# Patient Record
Sex: FEMALE | Race: WHITE | Employment: OTHER | URBAN - METROPOLITAN AREA
[De-identification: names, ages, dates, MRNs, and addresses within clinical notes are randomized per-mention and may not be internally consistent; named-entity substitution may affect disease eponyms.]

---

## 2017-04-25 ENCOUNTER — OFFICE VISIT (OUTPATIENT)
Dept: OPHTHALMOLOGY | Facility: CLINIC | Age: 82
End: 2017-04-25
Attending: OPHTHALMOLOGY
Payer: MEDICARE

## 2017-04-25 DIAGNOSIS — E11.311 DIABETIC MACULAR EDEMA (H): ICD-10-CM

## 2017-04-25 DIAGNOSIS — H35.00 BACKGROUND RETINOPATHY: ICD-10-CM

## 2017-04-25 DIAGNOSIS — H35.353 CYSTOID MACULAR DEGENERATION OF RETINA, BILATERAL: ICD-10-CM

## 2017-04-25 PROCEDURE — 92250 FUNDUS PHOTOGRAPHY W/I&R: CPT | Mod: 59 | Performed by: OPHTHALMOLOGY

## 2017-04-25 PROCEDURE — 92134 CPTRZ OPH DX IMG PST SGM RTA: CPT | Mod: ZF | Performed by: OPHTHALMOLOGY

## 2017-04-25 PROCEDURE — 99214 OFFICE O/P EST MOD 30 MIN: CPT | Mod: 25,ZF

## 2017-04-25 ASSESSMENT — VISUAL ACUITY
METHOD: SNELLEN - LINEAR
OS_CC+: -3
OS_CC: 20/30
OD_CC: 20/30
OD_CC+: -2
CORRECTION_TYPE: GLASSES

## 2017-04-25 ASSESSMENT — CUP TO DISC RATIO
OD_RATIO: 0.4
OS_RATIO: 0.4

## 2017-04-25 ASSESSMENT — REFRACTION_WEARINGRX
OS_AXIS: 160
OD_CYLINDER: +2.00
OD_SPHERE: -0.50
OD_ADD: +2.75
OS_AXIS: 160
OD_AXIS: 030
OD_AXIS: 013
OD_CYLINDER: +2.00
OS_ADD: +2.75
OS_SPHERE: -2.00
SPECS_TYPE: PAL
OS_CYLINDER: +2.00
OS_SPHERE: -1.75
OD_SPHERE: -0.50
OS_CYLINDER: +1.75

## 2017-04-25 ASSESSMENT — EXTERNAL EXAM - LEFT EYE: OS_EXAM: NORMAL

## 2017-04-25 ASSESSMENT — CONF VISUAL FIELD
OD_NORMAL: 1
OS_NORMAL: 1

## 2017-04-25 ASSESSMENT — EXTERNAL EXAM - RIGHT EYE: OD_EXAM: NORMAL

## 2017-04-25 ASSESSMENT — SLIT LAMP EXAM - LIDS
COMMENTS: NORMAL
COMMENTS: NORMAL

## 2017-04-25 ASSESSMENT — TONOMETRY
IOP_METHOD: ICARE
OS_IOP_MMHG: 11
OD_IOP_MMHG: 10

## 2017-04-25 NOTE — MR AVS SNAPSHOT
After Visit Summary   4/25/2017    Mila Rodriguez    MRN: [id number]           Patient Information     Date Of Birth          5/5/1927        Visit Information        Provider Department      4/25/2017 11:00 AM Em[facility]atriz Franco [facility]        Today's Diagnoses     Diabetic macular edema (H)        Cystoid macular degeneration of retina, bilateral        Background retinopathy           Follow-ups after your visit        Follow-up notes from your care team     Return in 4 months (on 8/25/2017) for OCT OU.      Future tests that were ordered for you today     Open Future Orders        Priority Expected Expires Ordered    OCT Retina Spectralis OU (both eyes) Routine  10/27/2018 4/25/2017    OCT Retina Spectralis OU (both eyes) Routine  10/27/2018 4/25/2017    OCT Retina Spectralis OU (both eyes) Routine  10/27/2018 4/25/2017            Who to contact     Please call your clinic at [phone] to:    Ask questions about your health    Make or cancel appointments    Discuss your medicines    Learn about your test results    Speak to your doctor   If you have compliments or concerns about an experience at your clinic, or if you wish to file a complaint, please contact [facility] Patient Relations at [phone] or email us at [email or web address]         Additional Information About Your Visit        MyChart Information     [employer] gives you secure access to your electronic health record. If you see a primary care provider, you can also send messages to your care team and make appointments. If you have questions, please call your primary care clinic.  If you do not have a primary care provider, please call [phone] and they will assist you.      [employer] is an electronic gateway that provides easy, online access to your medical records. With [employer], you can request a clinic appointment, read your test results, renew a prescription or communicate  with your care team.     To access your existing account, please contact your [facility] or call [phone] for assistance.        Care EveryWhere ID     This is your Care EveryWhere ID. This could be used by other organizations to access your [place] medical records  [id number]         Blood Pressure from Last 3 Encounters:   No data found for BP    Weight from Last 3 Encounters:   No data found for Wt              We Performed the Following     Fundus Photos OU (both eyes)     OCT Retina Spectralis OU (both eyes)        Primary Care Provider Office Phone # Fax #    Bandar Rhodes [phone]       [facility] [address]  [place] [address]        Thank you!     Thank you for choosing EYE CLINIC  for your care. Our goal is always to provide you with excellent care. Hearing back from our patients is one way we can continue to improve our services. Please take a few minutes to complete the written survey that you may receive in the mail after your visit with us. Thank you!             Your Updated Medication List - Protect others around you: Learn how to safely use, store and throw away your medicines at www.disposemymeds.org.          This list is accurate as of: 4/25/17  1:02 PM.  Always use your most recent med list.                   Brand Name Dispense Instructions for use    aspirin 325 MG tablet      Take 1 tablet by mouth daily       VITAMIN D3 PO      Take by mouth daily

## 2017-04-25 NOTE — PROGRESS NOTES
CC -   Retinopathy    INTERVAL HISTORY: Having trouble with vision due to glasses being made incorrect per patient. Otherwise stable. Leaving to [place] soon.     HPI -     Mila Rodriguez is a  89 year old year-old patient presenting for retinopathy.    Was told doesn't have diabetes by PMD per patient has checked A1c multiple times. No anemia.  Good BP control. No h/o carotid stenosis known.      PAST OCULAR HISTORY  CE/IOL OU  2011 & 2012    Retinal Imaging:  OCT  4-25-17  right eye- focal areas of atrophy, mild extrafoveal cysts, stable  left eye -  focal areas of atrophy, mild central cyst is smaller    PHOTOS 4/25/2017   Right eye - focal areas of RPE atrophy  Left eye - focal areas of RPE atrophy    FA 12-13-16  Right eye - (transit) increased arm-eye time, normal AV transit, 1+ increased MARQUISE, 1+ macular MAs and WD from atrophy, 3-4+ peripheral ischemia/non-perfusion, no NV, ONH normal,  ischemic leakage from vessels  Left eye - 1+ increased MARQUISE, 1+ macular MAs and WD from atrophy, 3-4+ peripheral ischemia/non-perfusion, no NV, ONH normal, ischemic leakage from vessels      ASSESSMENT & PLAN    1.  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  CME OU vs DME   - s/p Lucentis x 1 2/2012 OS   - looks typical for DME   - trace OU today   - observe    3.  PVD OU   - advised S/Sx RD         return to clinic: 3-4 months, OCT OU    Tez Fuentes MD MPH   Ophthalmology Resident PGY-3        ATTESTATION     Attending Physician Attestation:      Complete documentation of historical and exam elements from today's encounter can be found in the full encounter summary report (not redupilcated in this progress note).  I personally obtained the chief complaint(s) and hisotry of present illness., I have confirmed and edited as necessary the Past medical history/Past Surgical History, Social history, Family medical history,  Review of systems, and exam/neuro findings as obtained by the technician or others. I have examined this patient myself. , I personally viewed the relevant tests, image(s), reports, and studies listed above and the documentation reflects my findings and interpretation. and I formulated and edited as necessary the assessment and plan and discussed the findings and management plan with the patient and family.    Emperatriz Franco MD, PhD  , Vitreoretinal Surgery  Department of Ophthalmology  [facility]

## 2017-04-25 NOTE — NURSING NOTE
Chief Complaints and History of Present Illnesses   Patient presents with     Follow Up For     Vascular retinopathy OU     HPI    Affected eye(s):  Both   Symptoms:     No decreased vision   No floaters   No flashes         Do you have eye pain now?:  No      Comments:  Follow up Vascular retinopathy OU.  The patient is having difficulty with her glasses and she is unsure which ones to wear.    VIKAS Lan 11:09 AM 04/25/2017

## 2017-09-26 ENCOUNTER — OFFICE VISIT (OUTPATIENT)
Dept: OPHTHALMOLOGY | Facility: CLINIC | Age: 82
End: 2017-09-26
Attending: OPHTHALMOLOGY
Payer: MEDICARE

## 2017-09-26 DIAGNOSIS — H35.353 CYSTOID MACULAR DEGENERATION OF RETINA, BILATERAL: ICD-10-CM

## 2017-09-26 PROCEDURE — 99213 OFFICE O/P EST LOW 20 MIN: CPT | Mod: ZF

## 2017-09-26 PROCEDURE — 92134 CPTRZ OPH DX IMG PST SGM RTA: CPT | Mod: ZF | Performed by: OPHTHALMOLOGY

## 2017-09-26 ASSESSMENT — SLIT LAMP EXAM - LIDS
COMMENTS: NORMAL
COMMENTS: NORMAL

## 2017-09-26 ASSESSMENT — REFRACTION_WEARINGRX
OS_CYLINDER: +1.75
OD_SPHERE: -0.50
OD_CYLINDER: +2.00
OS_SPHERE: -1.75
OS_AXIS: 160
OS_ADD: +2.75
OS_SPHERE: -2.00
SPECS_TYPE: PAL
OD_AXIS: 013
OS_AXIS: 160
OD_AXIS: 030
OD_ADD: +2.75
OD_SPHERE: -0.50
OS_CYLINDER: +2.00
OD_CYLINDER: +2.00

## 2017-09-26 ASSESSMENT — TONOMETRY
OD_IOP_MMHG: 16
OS_IOP_MMHG: 17
IOP_METHOD: TONOPEN

## 2017-09-26 ASSESSMENT — EXTERNAL EXAM - RIGHT EYE: OD_EXAM: NORMAL

## 2017-09-26 ASSESSMENT — VISUAL ACUITY
METHOD: SNELLEN - LINEAR
OS_CC+: -2
OS_CC: 20/30
OS_CC: J1+
OD_CC: 20/30
OD_CC: J1+
OD_CC+: -2

## 2017-09-26 ASSESSMENT — CONF VISUAL FIELD
OS_NORMAL: 1
OD_NORMAL: 1
METHOD: COUNTING FINGERS

## 2017-09-26 ASSESSMENT — EXTERNAL EXAM - LEFT EYE: OS_EXAM: NORMAL

## 2017-09-26 ASSESSMENT — CUP TO DISC RATIO
OS_RATIO: 0.4
OD_RATIO: 0.4

## 2017-09-26 NOTE — MR AVS SNAPSHOT
After Visit Summary   9/26/2017    Mila Rodriguez    MRN: [id number]           Patient Information     Date Of Birth          5/5/1927        Visit Information        Provider Department      9/26/2017 11:45 AM Em[facility]atriz [facility] MD [facility]        Today's Diagnoses     Cystoid macular degeneration of retina, bilateral           Follow-ups after your visit        Follow-up notes from your care team     Return for FA OU (transit OD, Optos), OCT OU.      Future tests that were ordered for you today     Open Future Orders        Priority Expected Expires Ordered    OCT Retina Spectralis OU (both eyes) Routine  3/30/2019 9/26/2017    OCT Retina Spectralis OU (both eyes) Routine  3/30/2019 9/26/2017    Fluorescein Angiography OU (both eyes) Routine  3/30/2019 9/26/2017            Who to contact     Please call your clinic at [phone] to:    Ask questions about your health    Make or cancel appointments    Discuss your medicines    Learn about your test results    Speak to your doctor   If you have compliments or concerns about an experience at your clinic, or if you wish to file a complaint, please contact [facility] Patient Relations at [phone] or email us at [email or web address]         Additional Information About Your Visit        MyChart Information     [employer] gives you secure access to your electronic health record. If you see a primary care provider, you can also send messages to your care team and make appointments. If you have questions, please call your primary care clinic.  If you do not have a primary care provider, please call [phone] and they will assist you.      [employer] is an electronic gateway that provides easy, online access to your medical records. With [employer], you can request a clinic appointment, read your test results, renew a prescription or communicate with your care team.     To access your existing account, please  contact your [facility] or call [phone] for assistance.        Care EveryWhere ID     This is your Care EveryWhere ID. This could be used by other organizations to access your [place] medical records  [id number]         Blood Pressure from Last 3 Encounters:   No data found for BP    Weight from Last 3 Encounters:   No data found for Wt              We Performed the Following     OCT Retina Spectralis OU (both eyes)        Primary Care Provider Office Phone # Fax #    Bandar Rhodes [phone]       [facility] [address]  [facility] [address]        Equal Access to Services     [facility]: Hadii aad ku hadasho Soomaali, waaxda luqadaha, qaybta kaalmada adeegyada, waxay idiin hayaan adeeg khdorothy cat . So [facility] [phone].    ATENCIÓN: Si habla español, tiene a connolly disposición servicios gratuitos de asistencia lingüística. [facility] [phone].    We comply with applicable federal civil rights laws and [place] laws. We do not discriminate on the basis of race, color, national origin, age, disability sex, sexual orientation or gender identity.            Thank you!     Thank you for choosing [facility]  for your care. Our goal is always to provide you with excellent care. Hearing back from our patients is one way we can continue to improve our services. Please take a few minutes to complete the written survey that you may receive in the mail after your visit with us. Thank you!             Your Updated Medication List - Protect others around you: Learn how to safely use, store and throw away your medicines at www.disposemymeds.org.          This list is accurate as of: 9/26/17  3:02 PM.  Always use your most recent med list.                   Brand Name Dispense Instructions for use Diagnosis    aspirin 325 MG tablet      Take 1 tablet by mouth daily        VITAMIN D3 PO      Take by mouth daily

## 2017-09-26 NOTE — PROGRESS NOTES
CC -   Follow up Retinopathy    INTERVAL HISTORY: Having trouble with vision feels current glasses not good.   Otherwise stable.       HPI -     Mila Rodriguez is a  89 year old year-old patient presenting for retinopathy.    Was told doesn't have diabetes by PMD per patient has checked A1c multiple times. No anemia.  Good BP control. No h/o carotid stenosis known.  Patient denies HTn      PAST OCULAR HISTORY  CE/IOL OU  2011 & 2012    RETINAL IMAGING  OCT  9-26-17  right eye- focal areas of atrophy, mild extrafoveal cysts, stable  left eye -  focal areas of atrophy, mild central cyst is smaller    PHOTOS 4/25/2017   Right eye - focal areas of RPE atrophy  Left eye - focal areas of RPE atrophy    FA 12-13-16  Right eye - (transit) increased arm-eye time, normal AV transit, 1+ increased MARQUISE, 1+ macular MAs and WD from atrophy, 3-4+ peripheral ischemia/non-perfusion, no NV, ONH normal,  ischemic leakage from vessels  Left eye - 1+ increased MARQUISE, 1+ macular MAs and WD from atrophy, 3-4+ peripheral ischemia/non-perfusion, no NV, ONH normal, ischemic leakage from vessels      ASSESSMENT & PLAN    1.  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  CME OU     - s/p Lucentis x 1 2/2012 OS   - looks typical for diabetic macular edema but workup negative   - trace OU today   - observe    3.  PVD OU   - advised S/Sx Retinal Detachment         return to clinic: 3-4 months,  OCT both eyes, FA transit Right  Eye     Ángel Samayoa MD, PhD  Vitreoretinal Surgery Fellow      ATTESTATION     Attending Physician Attestation:      Complete documentation of historical and exam elements from today's encounter can be found in the full encounter summary report (not reduplicated in this progress note).  I personally obtained the chief complaint(s) and history of present illness.  I confirmed and edited as necessary the review of systems, past medical/surgical history, family history, social history, and examination findings as documented by others; and I examined the patient myself.  I personally reviewed the relevant tests, images, and reports as documented above.  I personally reviewed the ophthalmic test(s) associated with this encounter, agree with the interpretation(s) as documented by the resident/fellow, and have edited the corresponding report(s) as necessary.   I formulated and edited as necessary the assessment and plan and discussed the findings and management plan with the patient and family        Emperatriz Franco MD, PhD  , Vitreoretinal Surgery  Department of Ophthalmology  [facility]

## 2017-09-26 NOTE — NURSING NOTE
Chief Complaints and History of Present Illnesses   Patient presents with     Follow Up For     Diabetic macular edema (H) changes in [place]     HPI    Symptoms:              Comments:  She is here today for a follow up of Diabetic macular edema (H)   She feels her glasses are not working for her and brought two pair she feels are the same.  After reading both , I explained they are different. One for distance and the other was for reading or the computer.    Abilio Saenz COT 12:27 PM September 26, 2017

## 2019-01-14 ENCOUNTER — OFFICE VISIT (OUTPATIENT)
Dept: OPHTHALMOLOGY | Facility: CLINIC | Age: 84
End: 2019-01-14
Attending: OPHTHALMOLOGY
Payer: MEDICARE

## 2019-01-14 DIAGNOSIS — H35.353 CYSTOID MACULAR DEGENERATION OF RETINA, BILATERAL: ICD-10-CM

## 2019-01-14 DIAGNOSIS — H35.63 RETINAL HEMORRHAGE OF BOTH EYES: Primary | ICD-10-CM

## 2019-01-14 DIAGNOSIS — H35.00 RETINOPATHY: ICD-10-CM

## 2019-01-14 PROCEDURE — 92015 DETERMINE REFRACTIVE STATE: CPT | Mod: GY,ZF

## 2019-01-14 PROCEDURE — 92134 CPTRZ OPH DX IMG PST SGM RTA: CPT | Mod: ZF | Performed by: OPHTHALMOLOGY

## 2019-01-14 PROCEDURE — G0463 HOSPITAL OUTPT CLINIC VISIT: HCPCS | Mod: ZF,25

## 2019-01-14 PROCEDURE — 92235 FLUORESCEIN ANGRPH MLTIFRAME: CPT | Mod: ZF | Performed by: OPHTHALMOLOGY

## 2019-01-14 RX ORDER — SIMVASTATIN 40 MG
40 TABLET ORAL DAILY
Refills: 3 | COMMUNITY
Start: 2018-10-30

## 2019-01-14 RX ORDER — CLOPIDOGREL BISULFATE 75 MG/1
75 TABLET ORAL DAILY
Refills: 3 | COMMUNITY
Start: 2019-01-08

## 2019-01-14 RX ORDER — LISINOPRIL 2.5 MG/1
2.5 TABLET ORAL DAILY
Refills: 1 | COMMUNITY
Start: 2018-04-24

## 2019-01-14 RX ORDER — LISINOPRIL 5 MG/1
5 TABLET ORAL DAILY
Refills: 3 | COMMUNITY
Start: 2019-01-08 | End: 2019-01-14 | Stop reason: DRUGHIGH

## 2019-01-14 RX ORDER — ACETAMINOPHEN 160 MG
2000 TABLET,DISINTEGRATING ORAL DAILY
Refills: 11 | COMMUNITY
Start: 2018-10-16

## 2019-01-14 RX ORDER — TORSEMIDE 20 MG/1
20 TABLET ORAL DAILY
COMMUNITY
End: 2019-07-08

## 2019-01-14 RX ORDER — NYSTATIN 100000 U/G
100000 CREAM TOPICAL PRN
Refills: 0 | COMMUNITY
Start: 2018-04-24 | End: 2019-07-08

## 2019-01-14 RX ORDER — METOPROLOL SUCCINATE 50 MG/1
50 TABLET, EXTENDED RELEASE ORAL DAILY
Refills: 3 | COMMUNITY
Start: 2019-01-08

## 2019-01-14 ASSESSMENT — CUP TO DISC RATIO
OD_RATIO: 0.4
OS_RATIO: 0.4

## 2019-01-14 ASSESSMENT — VISUAL ACUITY
METHOD: SNELLEN - LINEAR
OD_CC: J3
OS_CC: 20/40-1
OD_CC+: -2
OS_CC+: +1
CORRECTION_TYPE: GLASSES
OS_CC: J3
OD_CC: 20/30

## 2019-01-14 ASSESSMENT — EXTERNAL EXAM - LEFT EYE: OS_EXAM: NORMAL

## 2019-01-14 ASSESSMENT — SLIT LAMP EXAM - LIDS
COMMENTS: NORMAL
COMMENTS: NORMAL

## 2019-01-14 ASSESSMENT — EXTERNAL EXAM - RIGHT EYE: OD_EXAM: NORMAL

## 2019-01-14 ASSESSMENT — REFRACTION_WEARINGRX
OS_SPHERE: -2.00
OD_SPHERE: -0.50
SPECS_TYPE: PAL
OS_ADD: +2.75
OS_CYLINDER: +1.75
OD_AXIS: 030
OD_ADD: +2.75
OD_CYLINDER: +2.00
OS_AXIS: 160

## 2019-01-14 ASSESSMENT — REFRACTION_MANIFEST
OD_SPHERE: -1.00
OD_ADD: +2.75
OD_CYLINDER: +2.00
OS_CYLINDER: +2.00
OD_AXIS: 032
OS_ADD: +2.75
OS_SPHERE: -1.25
OS_AXIS: 153

## 2019-01-14 ASSESSMENT — CONF VISUAL FIELD
METHOD: COUNTING FINGERS
OS_NORMAL: 1
OD_NORMAL: 1

## 2019-01-14 ASSESSMENT — TONOMETRY
IOP_METHOD: ICARE
OS_IOP_MMHG: 16
OD_IOP_MMHG: 15

## 2019-01-14 NOTE — NURSING NOTE
Chief Complaints and History of Present Illnesses   Patient presents with     Annual Eye Exam     Chief Complaint(s) and History of Present Illness(es)     Annual Eye Exam     Laterality: both eyes    Quality: blurred vision    Severity: mild    Timing: throughout the day and at random times    Course: stable    Associated symptoms: Negative for dryness, flashes and floaters    Treatments tried: eye drops    Response to treatment: mild improvement    Pain scale: 0/10              Comments     Biannual bilateral exam for Vascular Retinopathy     Vision is blurrier in the distance and near, Pt would like an updated gls Rx.     Ocular meds: ATs JAIMIE Siddiqi COT 9:23 AM January 14, 2019

## 2019-01-14 NOTE — PROGRESS NOTES
"CC -   Follow up Retinopathy    INTERVAL HISTORY:  Last visit > 1 year ago. Had transfemoral aortic valve replacement recently. Notices some declining vision OU over past few months. She also states that during her heart valve replacement she was told \"they see cancer\" but she does not know where they are talking about; she follows with oncology in future but not sure why.    MONTSE Rodriguez is a 89 year old year-old patient presenting for idiopathic vascular retinopathy.    Was told doesn't have diabetes by PMD per patient has checked A1c multiple times. No anemia.  Good BP control. No h/o carotid stenosis known.  Patient denies HTN    HbA1c 5.5 8/2016    PAST OCULAR HISTORY  CE/IOL OU  2011 & 2012    RETINAL IMAGING  OCT  1-14-19  OD- focal areas of atrophy, mild extrafoveal cysts, stable  OS -  focal areas of atrophy, mild extrafoveal nasal cysts    PHOTOS 4/25/2017   Right eye - focal areas of RPE atrophy  Left eye - focal areas of RPE atrophy    FA 1-14-19  Right eye - (transit) increased arm-eye time, normal AV transit, 1+ increased MARQUISE, 1+ macular MAs and WD from atrophy, 3-4+ peripheral ischemia/non-perfusion, no NV, ONH normal,  ischemic leakage from vessels  Left eye - 1+ increased MARQUISE, 1+ macular MAs and WD from atrophy, 3-4+ peripheral ischemia/non-perfusion, no NV, ONH normal, ischemic leakage from vessels      ASSESSMENT & PLAN    1.  Idiopathic Vascular retinopathy OU    - extensive peripheral ischemic changes no NV   - looks typical for severe NPDR   - patient states now told doesn't have DM and never did, A1c testing normal   - denies HTN   - carotid U/S 2016 < 50% bilateral   - patient has peripheral vascular disease   - no inflammation seen on exam (suspect vessel leakage is ischemic)      - FA 1/2019 similar to 12/2016   - Observe   - could consider SPEP and/or MRA if not done in past   - patient will contact PMD to discuss ?CA discovered during cardiac surgery    2.  H/o CME OU     - " s/p Lucentis x 1 2/2012 OS   - looks typical for diabetic macular edema but workup negative   - trace OU today   - observe    3.  PVD OU   - advised S/Sx Retinal Detachment      return to clinic: 3-4 months,  OCT both eyes    Tremayne Davis M.D.  PGY-3, Ophthalmology      ATTESTATION     Attending Physician Attestation:      Complete documentation of historical and exam elements from today's encounter can be found in the full encounter summary report (not reduplicated in this progress note).  I personally obtained the chief complaint(s) and history of present illness.  I confirmed and edited as necessary the review of systems, past medical/surgical history, family history, social history, and examination findings as documented by others; and I examined the patient myself.  I personally reviewed the relevant tests, images, and reports as documented above.  I personally reviewed the ophthalmic test(s) associated with this encounter, agree with the interpretation(s) as documented by the resident/fellow, and have edited the corresponding report(s) as necessary.   I formulated and edited as necessary the assessment and plan and discussed the findings and management plan with the patient and family    Emperatriz Franco MD, PhD  , Vitreoretinal Surgery  Department of Ophthalmology  [facility]

## 2019-06-27 ENCOUNTER — TELEPHONE (OUTPATIENT)
Dept: OPHTHALMOLOGY | Facility: CLINIC | Age: 84
End: 2019-06-27

## 2019-06-27 NOTE — TELEPHONE ENCOUNTER
[facility] Call Center    Phone Message    May a detailed message be left on voicemail: yes    Reason for Call: Other: Pt requests a phone call from either Asif  or Mayelin to assist with scheduling an appt om 7/8 which is urgent.     Action Taken: Message routed to:  [facility] ([facility] eye clinic

## 2019-07-08 ENCOUNTER — OFFICE VISIT (OUTPATIENT)
Dept: OPHTHALMOLOGY | Facility: CLINIC | Age: 84
End: 2019-07-08
Attending: OPHTHALMOLOGY
Payer: MEDICARE

## 2019-07-08 DIAGNOSIS — H35.353 CYSTOID MACULAR DEGENERATION OF RETINA, BILATERAL: ICD-10-CM

## 2019-07-08 PROCEDURE — G0463 HOSPITAL OUTPT CLINIC VISIT: HCPCS | Mod: ZF

## 2019-07-08 PROCEDURE — 92134 CPTRZ OPH DX IMG PST SGM RTA: CPT | Mod: ZF | Performed by: OPHTHALMOLOGY

## 2019-07-08 RX ORDER — ASPIRIN 81 MG/1
81 TABLET ORAL
COMMUNITY
Start: 2018-04-30

## 2019-07-08 ASSESSMENT — VISUAL ACUITY
OD_CC: 20/25
OS_CC+: -1+2
CORRECTION_TYPE: GLASSES
OS_CC: 20/40
METHOD: SNELLEN - LINEAR
OS_PH_CC: 20/30
OD_CC+: -2

## 2019-07-08 ASSESSMENT — REFRACTION_WEARINGRX
OD_SPHERE: -1.00
OS_AXIS: 153
OS_ADD: +2.75
OS_CYLINDER: +2.00
OD_ADD: +2.75
OS_SPHERE: -1.25
OD_CYLINDER: +2.00
OD_AXIS: 032

## 2019-07-08 ASSESSMENT — TONOMETRY
OD_IOP_MMHG: 17
IOP_METHOD: TONOPEN
OS_IOP_MMHG: 15

## 2019-07-08 ASSESSMENT — SLIT LAMP EXAM - LIDS
COMMENTS: NORMAL
COMMENTS: NORMAL

## 2019-07-08 ASSESSMENT — EXTERNAL EXAM - RIGHT EYE: OD_EXAM: NORMAL

## 2019-07-08 ASSESSMENT — CUP TO DISC RATIO
OD_RATIO: 0.4
OS_RATIO: 0.4

## 2019-07-08 ASSESSMENT — EXTERNAL EXAM - LEFT EYE: OS_EXAM: NORMAL

## 2019-07-08 ASSESSMENT — CONF VISUAL FIELD: OS_SUPERIOR_NASAL_RESTRICTION: 3

## 2019-07-08 NOTE — PROGRESS NOTES
CC -   Follow up Retinopathy    INTERVAL HISTORY:  More difficulty reading and seeing since JEROME.      HPI -     Mila Rodriguez is a 89 year old year-old patient presenting for idiopathic vascular retinopathy.    Was told doesn't have diabetes by PMD per patient has checked A1c multiple times. No anemia.  Good BP control. No h/o carotid stenosis known.  Patient denies HTN.  H/o aortic valve replacement, found ?mass ?etiology, but has declined further w/u    HbA1c 5.5 8/2016    PAST OCULAR HISTORY  CE/IOL OU  2011 & 2012    RETINAL IMAGING  OCT  7-8-19  OD- focal outer atrophy, mild extrafoveal cysts, stable  OS -  focal outer atrophy, mild extrafoveal nasal cysts      FA 1-14-19  Right eye - (transit) increased arm-eye time, normal AV transit, 1+ increased MARQUISE, 1+ macular MAs and WD from atrophy, 3-4+ peripheral ischemia/non-perfusion, no NV, ONH normal,  ischemic leakage from vessels  Left eye - 1+ increased MARQUISE, 1+ macular MAs and WD from atrophy, 3-4+ peripheral ischemia/non-perfusion, no NV, ONH normal, ischemic leakage from vessels      ASSESSMENT & PLAN    1.  Idiopathic Vascular retinopathy OU    - extensive peripheral ischemic changes no NV   - looks typical for severe NPDR   - patient states now told doesn't have DM and never did, A1c testing normal   - denies HTN   - carotid U/S 2016 < 50% bilateral   - patient has peripheral vascular disease   - no inflammation seen on exam (suspect vessel leakage is ischemic)      - FA 1/2019 similar to 12/2016   - Observe   - could consider SPEP and/or MRA if not done in past      2.  H/o CME OU     - s/p Lucentis x 1 2/2012 OS   - looks typical for diabetic macular edema but workup negative   - trace OU today   - observe    3.  PVD OU   - advised S/Sx Retinal Detachment 7/2019      return to clinic: 6 months,  OCT both eyes        ATTESTATION     Attending Physician Attestation:      Complete documentation of historical and exam elements from today's encounter can be  found in the full encounter summary report (not reduplicated in this progress note).  I personally obtained the chief complaint(s) and history of present illness.  I confirmed and edited as necessary the review of systems, past medical/surgical history, family history, social history, and examination findings as documented by others; and I examined the patient myself.  I personally reviewed the relevant tests, images, and reports as documented above.  I formulated and edited as necessary the assessment and plan and discussed the findings and management plan with the patient and family    Emperatriz Franco MD, PhD  , Vitreoretinal Surgery  Department of Ophthalmology  [facility]

## 2019-07-08 NOTE — NURSING NOTE
"Chief Complaints and History of Present Illnesses   Patient presents with     Follow Up     Chief Complaint(s) and History of Present Illness(es)     Follow Up     Laterality: both eyes    Associated symptoms: Negative for dryness, eye pain, redness and tearing    Pain scale: 0/10              Comments     Patient returns to clinic for a follow up of Idiopathic Vascular retinopathy of both eyes.  She got new glasses since her last exam and has trouble reading newspaper print ([employer]).  Using bright light is helping.  She feels like she is having to \"focus so much\".        Maryjo Reynolds COT 8:29 AM July 8, 2019                     "

## 2019-07-11 ENCOUNTER — TELEPHONE (OUTPATIENT)
Dept: OPHTHALMOLOGY | Facility: CLINIC | Age: 84
End: 2019-07-11

## 2019-07-11 NOTE — TELEPHONE ENCOUNTER
Reviewed able to schedule separate appt for Dr. Cummings a few days earlier (not on august 30th-- Dr. Cummings not in).  Pt would need to come back august 1st for Annel RICARDO appt    Pt ok keeping appt's as is august 1st    Reviewed with pt to check with insurance on coverage of refraction    Pt states received bill for refraction after January visit-- has not have refraction before that refraction since 2017 per pt  Asked pt to call insurance to review refraction policies      Asif Keita RN 4:23 PM 07/11/19        [facility] Call Center    Phone Message    May a detailed message be left on voicemail: no    Reason for Call: Symptoms or Concerns     If patient has red-flag symptoms, warm transfer to triage line    Current symptom or concern: Patient called stating she would like to be seen 7/30. There is no availability for that date. Stated that she would like to be seen earlier. She has an appointment scheduled for 8/1. Please advise.       Action Taken: Message routed to:  [facility] Ophthalmology

## 2019-08-01 ENCOUNTER — OFFICE VISIT (OUTPATIENT)
Dept: OPTOMETRY | Facility: CLINIC | Age: 84
End: 2019-08-01
Payer: MEDICARE

## 2019-08-01 ENCOUNTER — HOSPITAL ENCOUNTER (OUTPATIENT)
Dept: OCCUPATIONAL THERAPY | Facility: CLINIC | Age: 84
Discharge: HOME | End: 2019-08-01
Attending: OCCUPATIONAL THERAPIST | Admitting: OCCUPATIONAL THERAPIST
Payer: MEDICARE

## 2019-08-01 DIAGNOSIS — H35.00: Primary | ICD-10-CM

## 2019-08-01 DIAGNOSIS — H52.4 PRESBYOPIA: ICD-10-CM

## 2019-08-01 PROCEDURE — 97165 OT EVAL LOW COMPLEX 30 MIN: CPT | Mod: GO | Performed by: OCCUPATIONAL THERAPIST

## 2019-08-01 ASSESSMENT — REFRACTION_MANIFEST
OD_AXIS: 015
OD_CYLINDER: +2.00
OS_CYLINDER: +2.50
OS_SPHERE: -1.25
OS_AXIS: 160
OD_SPHERE: +0.75

## 2019-08-01 ASSESSMENT — VISUAL ACUITY
OD: 20/25
OS: 20/40

## 2019-08-01 ASSESSMENT — ACTIVITIES OF DAILY LIVING (ADL): PRIOR_ADL/IADL_STATUS: IMPAIRED PRIOR TO ONSET

## 2019-08-02 ASSESSMENT — VISUAL ACUITY
OS_CC: 20/40
OD_CC: 20/30
METHOD: SNELLEN - LINEAR

## 2019-08-02 ASSESSMENT — EXTERNAL EXAM - RIGHT EYE: OD_EXAM: NORMAL

## 2019-08-02 ASSESSMENT — CONF VISUAL FIELD
OS_NORMAL: 1
OD_NORMAL: 1

## 2019-08-02 ASSESSMENT — CUP TO DISC RATIO
OD_RATIO: 0.4
OS_RATIO: 0.4

## 2019-08-02 ASSESSMENT — TONOMETRY
IOP_METHOD: ICARE
OD_IOP_MMHG: 16
OS_IOP_MMHG: 16

## 2019-08-02 ASSESSMENT — SLIT LAMP EXAM - LIDS
COMMENTS: NORMAL
COMMENTS: NORMAL

## 2019-08-02 ASSESSMENT — EXTERNAL EXAM - LEFT EYE: OS_EXAM: NORMAL

## 2019-08-02 NOTE — PROGRESS NOTES
Assessment/Plan  (H35.00) Vascular retinopathy of both eyes  (primary encounter diagnosis)  Comment: Patient follows with Dr. Franco in retina. Prior testing for diabetes/HTN negative.  Plan: Continue following with retina clinic. RTC with any new blurring of vision or flashes/floaters/curtain over vision. Continue care with Low Vision OT regarding reading goals. Emphasized importance of adequate lighting when reading fine print.    (H52.4) Presbyopia  Plan: REFRACTION [35044]        SRx updated and released. Additional magnification with slightly reduced working distance was recommended and accepted by patient. Plan on monitoring annually, sooner with changes to vision or prolonged difficulty adapting to new Rx.       Complete documentation of historical and exam elements from today's encounter can  be found in the full encounter summary report (not reduplicated in this progress  note). I personally obtained the chief complaint(s) and history of present illness. I  confirmed and edited as necessary the review of systems, past medical/surgical  history, family history, social history, and examination findings as documented by  others; and I examined the patient myself. I personally reviewed the relevant tests,  images, and reports as documented above. I formulated and edited as necessary the  assessment and plan and discussed the findings and management plan with the  patient and family.    Freddie Cummings OD

## 2019-08-03 NOTE — PROGRESS NOTES
"   08/01/19 0900   Visit Type   Type of Visit Initial       Present No   General Information   Start Of Care Date 08/01/19   Referring Physician Freddie Cummings MD   Orders Evaluate And Treat As Indicated   Date of Order 08/01/19   Medical Diagnosis idiopathic vascular retinopathy   Onset Of Illness/injury Or Date Of Surgery 08/01/2019   Surgical/Medical history reviewed   (heart valve replacement, neuropathy in feet, 2 MI's )   Precautions/Limitations poor balance, lower extremity neuropathy - reports measurable change in balance this week. Recommended pt see primary care to evaluate   Prior ADL/IADL status Impaired prior to onset   Others present at visit   (dtr Jessie)   Patient/family Goals Statement reading, lighting, computer communications (can't see keyboard), needs light for filing   Social History/Home Environment   Living Environment   (senior apartment)   Current Community Support   (cleaning help occassional - PRN)   Avocational Reading, keeping abreast of politics:, [employer], Economist   Pain Assessment   Pain Reported No   Comments has developed pain in last week, gout arthritis. No pain while in rehabilitation clinic    Fall Risk Screen   Fall screen completed by OT   Have you fallen 2 or more times in the past year? No   Have you fallen and had an injury in the past year? No   Is patient a fall risk? Yes   Fall screen comments pt walked to clinic with single end cane with poor balance. \"caught\" self on wall X2 to prevent fall. Reports she walks \"fine\" in her own apartment. Strongly recommended evaluation by PCP to assess pt's reported change in function, and for fall assessment.    Cognitive/Behavioral   Communication Intact   Cognitive Status Intact for evaluation process   Behavior Appropriate   Patient/family aware of diagnosis Yes   How well do you understand your eye condition? Well   Vision related restrictions on visiting with family/friends Mild   Reported " "emotional impact of visual impairment Moderate   Adjustment to disability   (pt verbalizing distress - recent decrease in function)   Cognitive/Behavioral Comment pt verbalizing changes in balance and function \"this week\" encouraged to see primary care MD to assess status. Pt's dtr present for discussion. Pt verbalized good understanding   Physical Status/Equipment   Physical Status Weakness;Impaired balance   Mobility equipment used Support cane;Wheelchair   ADL Equipment used Adaptive ADL Equipment  (has husbands walker - seated)   Visual Report   Functional Complaints Reading  (computer communications, lighting, safety mobility)   Visual Complaints Visual fatigue  (poor ability to see targets without bright light)   Leighton Beard Symptoms? No   Magnifier (strength and type) none   Reading glasses   (issued new prescrition per Dr. Cummings - )   Technology Computer  (difficulty seeing Keyboard)   Lighting and Glare   Is your lighting adequate? Yes/ at home   Is glare a problem? Yes/ outdoors   Are you satisfied with your sunglasses? Yes   Visual Acuity   Acuity right eye 20/25   Acuity left eye 20/40   Contrast Sensitivity   Contrast sensitivity (score/25) 25/25   MN Read   Smallest print size read 0.5M   Critical print size 0.8M   Words per minute at critical print size 150   Dynavision: Evaluation of visual skills/search of extra personal space via 5X4 foot computerized light board with 64 stimuli.  The user reacts as quickly as possible by striking the lights as they turn on in random succession.   Dynavision Cascade Dynavision Mode A (single stimulus attention, 1 minute   Dynavision Mode A (single stimulus attention, 1 minute)   Patient Score (Mode A, 1 min) 42, 40   Dynavision Mode A (SSA, 1 Min) Comment 52+ is safe driving score.  Pt verbalized understanidng that her reactions time was below \"safe driving\" parameters. Identified that lower extremity neuropathy would also interfere with safe driving "   Education   Learner Patient;Family  (Dtr Jessie)   Readiness Acceptance  (pt verbalizing some emotional distress over decline-function)   Method Explanation;Demonstration   Response Verbalizes understanding;Needs reinforcement   Clinical Impression, OT Eval   Criteria for Skilled Therapeutic Interventions Met yes;treatment indicated   Therapy  Diagnosis: Impaired ADL/IADL with deficits in Reading based ADL  (computer communications, visual component safety in mobility)   Assessment of Occupational Performance 3-5 Performance Deficits   Identified Performance Deficits as above   Clinical Decision Making (Complexity) Low complexity   Clinical Impression Comments Pt seen by low vision optometrist this date. New glasses prescription issued. Pt will return to therapy following acquisition of new glasses    OT Visual Rehabilitation Evaluation Plan   Therapy Plan Occupational therapy intervention   Planned Interventions Low vision compensatory training for reading;Instruction in environmental adaptations for lighting;Optical device/ADL device instruction and training;Computer modifications  (visual componenet of safety in mobility)   Frequency / Duration 2 tx visits over 12 weeks (pt will not return unitl new glasses acquired and adjusted to)   Risks and Benefits of Treatment have been explained. Yes   Patient, Family in agreement with plan of care Yes   GOALS   Goals Near Vision;Environmental Modification;Resource Education   Goal 1 - Near Vision   Goal Description: Near Vision   (Identify 3 strategies to increase reading endurance)   Target Date 10/24/19   Goal 3 - Environmental modification   Goal Description: Environment modification Patient will demonstrate understanding of the impact of lighting, contrast and glare on ADL activities, in conjunction with environmentally-based ADL modifications   Target Date 10/24/19   Goal 4 - Resource education   Goal Description: Resource education Patient will verbalize  awareness of community resources for the following:;Access to large print materials;Access to low vision devices   Target Date 10/24/19   Total Evaluation Time   OT Eval, Low Complexity Minutes (17832) 60   Therapy Certification   Certification date from 08/03/19   Certification date to 10/24/19   Medical Diagnosis idiopathic vascular retinopathy

## 2019-08-03 NOTE — PROGRESS NOTES
[facility]          OUTPATIENT OCCUPATIONALTHERAPY  EVALUATION  PLAN OF TREATMENT FOR OUTPATIENT REHABILITATION  (COMPLETE FOR INITIAL CLAIMS ONLY)  Patient's Last Name, First Name, M.I.  YOB: 1927  Mila Rodriguez      Provider's Name  [facility] Medical Record No.  [id number]   Onset Date:  08/01/2019 Start of Care Date:  08/01/19   Type:     ___PT  _X_OT   ___SLP Medical Diagnosis:  idiopathic vascular retinopathy   Therapy Diagnosis: Impaired ADL/IADL with deficits in Reading based ADL(computer communications, visual component safety in mobility)    Visits from SOC: 1     _________________________________________________________________________________  Plan of Treatment/Functional Goals:  Planned Interventions: Low vision compensatory training for reading, Instruction in environmental adaptations for lighting, Optical device/ADL device instruction and training, Computer modifications(visual componenet of safety in mobility)        Goals  1. (Identify 3 strategies to increase reading endurance)         Target Date: 10/24/19      2.                  3.  Patient will demonstrate understanding of the impact of lighting, contrast and glare on ADL activities, in conjunction with environmentally-based ADL modifications         Target Date: 10/24/19      4. Patient will verbalize awareness of community resources for the following:, Access to large print materials, Access to low vision devices         Target Date: 10/24/19      5.                   6.                    7.                 8.                            Therapy Frequency/Duration:  2 tx visits over 12 weeks (pt will not return unitl new glasses acquired and adjusted to)    Annel Leigh, OT       I CERTIFY THE NEED FOR THESE SERVICES FURNISHED UNDER        THIS PLAN OF TREATMENT AND WHILE UNDER MY CARE      (Physician co-signature of this document indicates review and certification of the therapy plan).                  Certification date from: 08/03/19 Certification date to: 10/24/19          Referring Physician: Freddie Cummings MD     Initial Assessment        See Epic Evaluation Start Of Care Date: 08/01/19     Annel Leigh

## 2019-08-09 ENCOUNTER — TELEPHONE (OUTPATIENT)
Dept: OPHTHALMOLOGY | Facility: CLINIC | Age: 84
End: 2019-08-09

## 2019-08-09 NOTE — TELEPHONE ENCOUNTER
Spoke to pt at [id number]  Reviewed right eye Rx would notice more increase in reading power-- +2 diopter from previous Rx (+0.75 sphere right eye on new Rx for distance and previously -1.00 diopters at distance per 7-8-19 wearing Rx plus a + 0.25 increase to add)    Dr. Cummings confirmed signs correct on Rx      Pt has questions on pupillary distance for online purchase    Reviewed typically done at optical shops where pt's purchasing frames/lenses  Able to perform here or pt may go to local optical shop where feedback from others state able to measure with purchase and friendly doing s        Reviewed Dr. Cummings noted signs correct and ok for pt to use Rx to purchase glasses    Reviewed may have near vision pupillary distance (not necessary) and pupillary distance check at local eye doctor    Reviewed Dr. Cummings able to work more with pt if not happy with glasses Rx    Pt seemed satisfied with conversation    Note to Dr. Emiliano Keita RN 4:37 PM 08/09/19            [facility] Call Center    Phone Message    May a detailed message be left on voicemail: yes    Reason for Call: Other: Pt stating Rxfor glasses does not appears coorect     Action Taken: Message routed to:  [facility] eye

## 2019-08-12 ENCOUNTER — TELEPHONE (OUTPATIENT)
Dept: OPHTHALMOLOGY | Facility: CLINIC | Age: 84
End: 2019-08-12

## 2019-08-12 NOTE — TELEPHONE ENCOUNTER
[facility] Call Center    Phone Message    May a detailed message be left on voicemail: yes    Reason for Call: Other: per pt- is waiting for a call from  from encounter on 8/9/19 about her glasses rx, please call pt thanks     Action Taken: Message routed to:  [facility] ([facility] eye

## 2019-08-13 NOTE — TELEPHONE ENCOUNTER
Mailed last gls RX to patient today, per Dr. Cummings's request    Lyric Verduzco August 13, 2019 4:13 PM

## 2019-08-19 ENCOUNTER — TELEPHONE (OUTPATIENT)
Dept: OPHTHALMOLOGY | Facility: CLINIC | Age: 84
End: 2019-08-19

## 2019-08-19 NOTE — TELEPHONE ENCOUNTER
[facility] Call Center    Phone Message    May a detailed message be left on voicemail: yes    Reason for Call: Other: Problem with her glasses Rx - Pt requests call back to discuss - Thanks     Action Taken: Message routed to:  [facility] EYE

## 2019-08-19 NOTE — TELEPHONE ENCOUNTER
Dr. Cummings - this patient wants to speak with YOU again about her prescription.  There are different types of progressives that Andrey mixon does and she is wondering what you would recommend.      She states that if she understands it correctly one gives you a larger area of view for near and one gives a larger area of view for distance and she is not sure which one she should get.      She was also told that she would need an additional measurement for her progressives.  I spoke with her about them needing the PD and she said they do need that but that wasn't the additional measurement she needed to get.  I have no previous PD recorded.      She is VERY eager to speak with you directly.    Cici Hannah, COT 4:31 PM 08/19/2019

## 2020-05-22 NOTE — ADDENDUM NOTE
Encounter addended by: Annel Leigh OT on: 5/22/2020 7:15 AM   Actions taken: Clinical Note Signed, Flowsheet accepted, Episode resolved

## 2020-05-22 NOTE — PROGRESS NOTES
"   08/01/19 0900   Visit Type   Type of Visit Initial  (Discharge: pt failed to schedule future visits to address PO)       Present No   General Information   Start Of Care Date 08/01/19   Referring Physician Freddie Cummings MD   Orders Evaluate And Treat As Indicated   Date of Order 08/01/19   Medical Diagnosis idiopathic vascular retinopathy   Onset Of Illness/injury Or Date Of Surgery 08/01/2019   Surgical/Medical history reviewed   (heart valve replacement, neuropathy in feet, 2 MI's )   Precautions/Limitations poor balance, lower extremity neuropathy - reports measurable change in balance this week. Recommended pt see primary care to evaluate   Prior ADL/IADL status Impaired prior to onset   Others present at visit   (dtr Jessie)   Patient/family Goals Statement reading, lighting, computer communications (can't see keyboard), needs light for filing   Social History/Home Environment   Living Environment   (senior apartment)   Current Community Support   (cleaning help occassional - PRN)   Avocational Reading, keeping abreast of politics:, [employer], Economist   Pain Assessment   Pain Reported No   Comments has developed pain in last week, gout arthritis. No pain while in rehabilitation clinic    Fall Risk Screen   Fall screen completed by OT   Have you fallen 2 or more times in the past year? No   Have you fallen and had an injury in the past year? No   Is patient a fall risk? Yes   Fall screen comments pt walked to clinic with single end cane with poor balance. \"caught\" self on wall X2 to prevent fall. Reports she walks \"fine\" in her own apartment. Strongly recommended evaluation by PCP to assess pt's reported change in function, and for fall assessment.    Cognitive/Behavioral   Communication Intact   Cognitive Status Intact for evaluation process   Behavior Appropriate   Patient/family aware of diagnosis Yes   How well do you understand your eye condition? Well   Vision related " "restrictions on visiting with family/friends Mild   Reported emotional impact of visual impairment Moderate   Adjustment to disability   (pt verbalizing distress - recent decrease in function)   Cognitive/Behavioral Comment pt verbalizing changes in balance and function \"this week\" encouraged to see primary care MD to assess status. Pt's dtr present for discussion. Pt verbalized good understanding   Physical Status/Equipment   Physical Status Weakness;Impaired balance   Mobility equipment used Support cane;Wheelchair   ADL Equipment used Adaptive ADL Equipment  (has husbands walker - seated)   Visual Report   Functional Complaints Reading  (computer communications, lighting, safety mobility)   Visual Complaints Visual fatigue  (poor ability to see targets without bright light)   Leighton Beard Symptoms? No   Magnifier (strength and type) none   Reading glasses   (issued new prescrition per Dr. Cummings - )   Technology Computer  (difficulty seeing Keyboard)   Lighting and Glare   Is your lighting adequate? Yes/ at home   Is glare a problem? Yes/ outdoors   Are you satisfied with your sunglasses? Yes   Visual Acuity   Acuity right eye 20/25   Acuity left eye 20/40   Contrast Sensitivity   Contrast sensitivity (score/25) 25/25   [place] Read   Smallest print size read 0.5M   Critical print size 0.8M   Words per minute at critical print size 150   Dynavision: Evaluation of visual skills/search of extra personal space via 5X4 foot computerized light board with 64 stimuli.  The user reacts as quickly as possible by striking the lights as they turn on in random succession.   Dynavision Cascade Dynavision Mode A (single stimulus attention, 1 minute   Dynavision Mode A (single stimulus attention, 1 minute)   Patient Score (Mode A, 1 min) 42, 40   Dynavision Mode A (SSA, 1 Min) Comment 52+ is safe driving score.  Pt verbalized understanidng that her reactions time was below \"safe driving\" parameters. Identified that lower extremity " neuropathy would also interfere with safe driving   Education   Learner Patient;Family  (Dtr Jessie)   Readiness Acceptance  (pt verbalizing some emotional distress over decline-function)   Method Explanation;Demonstration   Response Verbalizes understanding;Needs reinforcement   Clinical Impression, OT Eval   Criteria for Skilled Therapeutic Interventions Met yes;treatment indicated   Therapy  Diagnosis: Impaired ADL/IADL with deficits in Reading based ADL  (computer communications, visual component safety in mobility)   Assessment of Occupational Performance 3-5 Performance Deficits   Identified Performance Deficits as above   Clinical Decision Making (Complexity) Low complexity   Clinical Impression Comments Pt seen by low vision optometrist this date. New glasses prescription issued. Pt will return to therapy following acquisition of new glasses    OT Visual Rehabilitation Evaluation Plan   Therapy Plan Occupational therapy intervention   Planned Interventions Low vision compensatory training for reading;Instruction in environmental adaptations for lighting;Optical device/ADL device instruction and training;Computer modifications  (visual componenet of safety in mobility)   Frequency / Duration 2 tx visits over 12 weeks (pt will not return unitl new glasses acquired and adjusted to)   Risks and Benefits of Treatment have been explained. Yes   Patient, Family in agreement with plan of care Yes   GOALS   Goals Near Vision;Environmental Modification;Resource Education   Goal 1 - Near Vision   Goal Description: Near Vision   (Identify 3 strategies to increase reading endurance)   Target Date 10/24/19   Goal 3 - Environmental modification   Goal Description: Environment modification Patient will demonstrate understanding of the impact of lighting, contrast and glare on ADL activities, in conjunction with environmentally-based ADL modifications   Target Date 10/24/19   Goal 4 - Resource education   Goal Description:  Resource education Patient will verbalize awareness of community resources for the following:;Access to large print materials;Access to low vision devices   Target Date 10/24/19   Total Evaluation Time   OT Eval, Low Complexity Minutes (23961) 60   Therapy Certification   Certification date from 08/03/19   Certification date to 10/24/19   Medical Diagnosis idiopathic vascular retinopathy   Certification   (Discharge: Failed to return to address POC)